# Patient Record
Sex: FEMALE | Race: WHITE
[De-identification: names, ages, dates, MRNs, and addresses within clinical notes are randomized per-mention and may not be internally consistent; named-entity substitution may affect disease eponyms.]

---

## 2019-06-11 ENCOUNTER — HOSPITAL ENCOUNTER (OUTPATIENT)
Dept: HOSPITAL 84 - D.RT | Age: 54
Discharge: HOME | End: 2019-06-11
Attending: INTERNAL MEDICINE
Payer: MEDICAID

## 2019-06-11 DIAGNOSIS — J44.9: Primary | ICD-10-CM

## 2019-09-18 ENCOUNTER — HOSPITAL ENCOUNTER (OUTPATIENT)
Dept: HOSPITAL 84 - D.OPS | Age: 54
Discharge: HOME | End: 2019-09-18
Attending: INTERNAL MEDICINE
Payer: MEDICAID

## 2019-09-18 VITALS
HEIGHT: 60 IN | BODY MASS INDEX: 27.54 KG/M2 | WEIGHT: 140.29 LBS | WEIGHT: 140.29 LBS | BODY MASS INDEX: 27.54 KG/M2 | HEIGHT: 60 IN

## 2019-09-18 VITALS — DIASTOLIC BLOOD PRESSURE: 79 MMHG | SYSTOLIC BLOOD PRESSURE: 130 MMHG

## 2019-09-18 DIAGNOSIS — K29.70: ICD-10-CM

## 2019-09-18 DIAGNOSIS — K21.0: ICD-10-CM

## 2019-09-18 DIAGNOSIS — K22.2: ICD-10-CM

## 2019-09-18 DIAGNOSIS — R11.2: ICD-10-CM

## 2019-09-18 DIAGNOSIS — K44.9: ICD-10-CM

## 2019-09-18 DIAGNOSIS — R13.10: Primary | ICD-10-CM

## 2019-09-18 LAB
ANION GAP SERPL CALC-SCNC: 12.5 MMOL/L (ref 8–16)
BUN SERPL-MCNC: 22 MG/DL (ref 7–18)
CALCIUM SERPL-MCNC: 9 MG/DL (ref 8.5–10.1)
CHLORIDE SERPL-SCNC: 108 MMOL/L (ref 98–107)
CO2 SERPL-SCNC: 28.9 MMOL/L (ref 21–32)
CREAT SERPL-MCNC: 0.9 MG/DL (ref 0.6–1.3)
ERYTHROCYTE [DISTWIDTH] IN BLOOD BY AUTOMATED COUNT: 13.1 % (ref 11.5–14.5)
GLUCOSE SERPL-MCNC: 102 MG/DL (ref 74–106)
HCT VFR BLD CALC: 38.4 % (ref 36–48)
HGB BLD-MCNC: 13 G/DL (ref 12–16)
MCH RBC QN AUTO: 27.9 PG (ref 26–34)
MCHC RBC AUTO-ENTMCNC: 33.9 G/DL (ref 31–37)
MCV RBC: 82.4 FL (ref 80–100)
OSMOLALITY SERPL CALC.SUM OF ELEC: 291 MOSM/KG (ref 275–300)
PLATELET # BLD: 230 10X3/UL (ref 130–400)
PMV BLD AUTO: 10.4 FL (ref 7.4–10.4)
POTASSIUM SERPL-SCNC: 4.4 MMOL/L (ref 3.5–5.1)
RBC # BLD AUTO: 4.66 10X6/UL (ref 4–5.4)
SODIUM SERPL-SCNC: 145 MMOL/L (ref 136–145)
WBC # BLD AUTO: 7.2 10X3/UL (ref 4.8–10.8)

## 2019-09-18 NOTE — OP
PATIENT NAME:  KARINE CHAWLA                    MEDICAL RECORD: [id number]
:61                                             LOCATION:[facility]          
                                                         ADMISSION DATE:        
SURGEON:  BAO THOMPSON DO             
 
 
DATE OF OPERATION:  2019
 
PROCEDURE:  EGD with biopsies.
 
INDICATIONS FOR PROCEDURE:  Dysphagia, GERD, nausea, and vomiting.
 
SCOPE:  Olympus video gastroscope.
 
MEDICATIONS:  Propofol 150 mg IV per anesthesia.
 
ESTIMATED BLOOD LOSS:  Minimal.
 
COMPLICATIONS:  None.
 
FINDINGS:  Informed consent was given.  The patient was made comfortable with
the above medication.  After reaching an adequate level of sedation by slow IV
push, the patient was placed on her left side.  The endoscope was advanced under
direct visualization through the mouth to the second portion of the duodenum
with ease.  The entire esophagus appeared normal down to the GE junction.  At
the GE junction, there was some mild esophageal stenosis which was traversed. 
At the end of the procedure, an 18-20 mm CRE dilating balloon was placed through
the working channel of the endoscope and the stenosis was dilated up to 20 mm
diameter maximum successfully.  At the GE junction, there was evidence of LA
class C reflux-induced esophagitis.  Cold forceps biopsies were taken to submit
for histopathology.  The endoscope was advanced beyond the GE junction into the
stomach and retroflexed to view the cardia, where a small sliding hiatal hernia
was present.  The fundus of the stomach appeared normal.  Throughout the body
and antrum of the stomach, there was some erythema, granularity, and congestion
consistent with gastritis.  Cold forceps, biopsies were taken to submit for
histopathology and to rule out the presence of H. pylori.  The endoscope was
advanced beyond the pylorus into the duodenum, which appeared normal down to the
second portion.  The endoscope was then withdrawn from the patient.  The patient
tolerated the procedure well and there were no complications.
 
IMPRESSION:
1.  Esophageal ring/stenosis located at the GE junction, status post dilation
with a CRE balloon to 20 mm successfully.
2.  LA class C reflux-induced esophagitis.
3.  Small sliding hiatal hernia.
4.  Gastritis.
 
PLAN AND RECOMMENDATIONS:
1.  Discharge home when recovery parameters are met.
2.  Follow up biopsy specimen results.
3.  GERD diet and reflux precautions.
4.  Continue omeprazole, but increase to 40 mg in the morning.
5.  We will add Zantac or Pepcid in the evening for 30 days.
6.  Follow up in the GI clinic in 1 month.
7.  Consider manometry study if continued dysphagia.
8.  Consider workup of gallbladder and gastric emptying scan if continued nausea
and vomiting at followup.
 
 
 
 
OPERATIVE REPORT                               [id number]    CHAWLAKARINE  
 
 
TRANSINT:[id number] Voice Confirmation ID: [id number] DOCUMENT ID: [id number]
                                           
                                           BAO THOMPSON DO             
 
 
 
Electronically Signed by BAO CANTU on 19 at 1630
 
 
 
 
 
 
 
 
 
 
 
 
 
 
 
 
 
 
 
 
 
 
 
 
 
 
 
 
 
 
 
 
 
 
 
 
 
 
 
 
CC:                                                             [id number]
DICTATION DATE: 19 1025     :     19 1207      [facility] 
                                                                      19
[facility]                                          
[address] [place] [address]

## 2019-10-29 ENCOUNTER — HOSPITAL ENCOUNTER (OUTPATIENT)
Dept: HOSPITAL 84 - D.RAD | Age: 54
Discharge: HOME | End: 2019-10-29
Attending: INTERNAL MEDICINE
Payer: MEDICAID

## 2019-10-29 DIAGNOSIS — R13.10: Primary | ICD-10-CM

## 2019-10-29 DIAGNOSIS — K21.9: ICD-10-CM

## 2019-10-29 DIAGNOSIS — R10.9: ICD-10-CM

## 2019-11-04 ENCOUNTER — HOSPITAL ENCOUNTER (OUTPATIENT)
Dept: HOSPITAL 84 - D.OPS | Age: 54
Discharge: HOME | End: 2019-11-04
Attending: INTERNAL MEDICINE
Payer: MEDICAID

## 2019-11-04 VITALS
WEIGHT: 151.32 LBS | BODY MASS INDEX: 29.71 KG/M2 | WEIGHT: 151.32 LBS | HEIGHT: 60 IN | HEIGHT: 60 IN | BODY MASS INDEX: 29.71 KG/M2

## 2019-11-04 VITALS — DIASTOLIC BLOOD PRESSURE: 83 MMHG | SYSTOLIC BLOOD PRESSURE: 142 MMHG

## 2019-11-04 DIAGNOSIS — K63.5: Primary | ICD-10-CM

## 2019-11-04 DIAGNOSIS — R19.7: ICD-10-CM

## 2019-11-04 DIAGNOSIS — K57.90: ICD-10-CM

## 2019-11-04 DIAGNOSIS — R10.9: ICD-10-CM

## 2019-11-04 DIAGNOSIS — K64.8: ICD-10-CM

## 2019-11-04 LAB
ANION GAP SERPL CALC-SCNC: 8.7 MMOL/L (ref 8–16)
BUN SERPL-MCNC: 14 MG/DL (ref 7–18)
CALCIUM SERPL-MCNC: 9.5 MG/DL (ref 8.5–10.1)
CHLORIDE SERPL-SCNC: 103 MMOL/L (ref 98–107)
CO2 SERPL-SCNC: 27.9 MMOL/L (ref 21–32)
CREAT SERPL-MCNC: 0.8 MG/DL (ref 0.6–1.3)
ERYTHROCYTE [DISTWIDTH] IN BLOOD BY AUTOMATED COUNT: 13.3 % (ref 11.5–14.5)
GLUCOSE SERPL-MCNC: 98 MG/DL (ref 74–106)
HCT VFR BLD CALC: 43.6 % (ref 36–48)
HGB BLD-MCNC: 14.3 G/DL (ref 12–16)
MCH RBC QN AUTO: 28.2 PG (ref 26–34)
MCHC RBC AUTO-ENTMCNC: 32.8 G/DL (ref 31–37)
MCV RBC: 86 FL (ref 80–100)
OSMOLALITY SERPL CALC.SUM OF ELEC: 272 MOSM/KG (ref 275–300)
PLATELET # BLD: 289 10X3/UL (ref 130–400)
PMV BLD AUTO: 10.7 FL (ref 7.4–10.4)
POTASSIUM SERPL-SCNC: 3.6 MMOL/L (ref 3.5–5.1)
RBC # BLD AUTO: 5.07 10X6/UL (ref 4–5.4)
SODIUM SERPL-SCNC: 136 MMOL/L (ref 136–145)
WBC # BLD AUTO: 6.7 10X3/UL (ref 4.8–10.8)

## 2019-11-06 NOTE — OP
PATIENT NAME:  KARINE CHAWLA                    MEDICAL RECORD: [id number]
:61                                             LOCATION:[facility]          
                                                         ADMISSION DATE:        
SURGEON:  BAO THOMPSON DO             
 
 
DATE OF OPERATION:  2019
 
PROCEDURE:  Colonoscopy with polypectomy.
 
INDICATIONS FOR PROCEDURE:  Diarrhea and generalized abdominal pain.
 
SCOPE:  Olympus video pediatric colonoscope.
 
MEDICATIONS:  Propofol 280 mg IV per anesthesia.
 
WITHDRAWAL TIME:  9 minutes.
 
ESTIMATED BLOOD LOSS:  Minimal.
 
COMPLICATIONS:  None.
 
FINDINGS:  Informed consent was given.  The patient was made comfortable with
the above medication.  After reaching an adequate level of sedation by slow IV
push, the patient was placed on her left side.  A digital rectal examination was
performed and was normal.  The endoscope was then advanced under direct
visualization through the rectum to the cecum and terminal ileum.  The endoscope
was slowly withdrawn and the mucosa was carefully examined.  The prep quality
was good.  There was a single benign-appearing sessile polyp, which measured
approximately 4 mm in diameter that was located in the sigmoid colon.  It was
removed using hot forceps.  There was evidence of pan diverticulosis of mild
severity.  There was no evidence of diverticulitis.  Retroflexion was performed
in the rectum with visualization of grade I internal hemorrhoids without
bleeding.  The endoscope was withdrawn from the patient.  The patient tolerated
the procedure well and there were no complications.
 
IMPRESSION:
1.  Benign appearing sessile polyp located in the sigmoid colon, which was
removed using hot forceps.
2.  Mild pan diverticulosis without diverticulitis.
3.  Grade I internal hemorrhoids without bleeding.
 
PLAN AND RECOMMENDATIONS:
1.  Discharge home when recovery parameters are met.
2.  Follow up biopsy specimen results.
3.  High fiber diet.
4.  Continue current medications.
5.  Recall colonoscopy in 5 years for surveillance based on personal history of
polyps.
6.  The patient should notify the clinic if the diarrhea returns or the
abdominal pain returns.  Currently, it seems that these symptoms have resolved.
 
TRANSINT:[id number] Voice Confirmation ID: [id number] DOCUMENT ID: [id number]
 
 
 
OPERATIVE REPORT                               [id number]    KARINE CHAWLA  
 
 
                                           
                                           BAO THOMPSON DO             
 
 
 
Electronically Signed by BAO CANTU on 19 at 0736
 
 
 
 
 
 
 
 
 
 
 
 
 
 
 
 
 
 
 
 
 
 
 
 
 
 
 
 
 
 
 
 
 
 
 
 
 
 
 
 
 
CC:                                                             [id number]
DICTATION DATE: 19 1037     :     19 1050      [facility] 
                                                                      19
[facility]                                          
 [place] [place] [address]

## 2019-11-21 ENCOUNTER — HOSPITAL ENCOUNTER (OUTPATIENT)
Dept: HOSPITAL 84 - D.OPS | Age: 54
Discharge: HOME | End: 2019-11-21
Attending: INTERNAL MEDICINE
Payer: MEDICAID

## 2019-11-21 VITALS — BODY MASS INDEX: 29.5 KG/M2

## 2019-11-21 DIAGNOSIS — R13.10: Primary | ICD-10-CM

## 2019-11-21 DIAGNOSIS — K21.9: ICD-10-CM

## 2019-11-21 DIAGNOSIS — R10.9: ICD-10-CM

## 2020-01-17 ENCOUNTER — HOSPITAL ENCOUNTER (OUTPATIENT)
Dept: HOSPITAL 84 - D.NM | Age: 55
Discharge: HOME | End: 2020-01-17
Attending: SURGERY
Payer: MEDICAID

## 2020-01-17 VITALS — BODY MASS INDEX: 29.5 KG/M2

## 2020-01-17 DIAGNOSIS — K21.0: Primary | ICD-10-CM

## 2020-06-24 ENCOUNTER — HOSPITAL ENCOUNTER (OUTPATIENT)
Dept: HOSPITAL 84 - D.MAMMO | Age: 55
Discharge: HOME | End: 2020-06-24
Attending: CLINICAL NURSE SPECIALIST
Payer: MEDICAID

## 2020-06-24 VITALS — BODY MASS INDEX: 29.3 KG/M2

## 2020-06-24 DIAGNOSIS — Z12.31: Primary | ICD-10-CM

## 2021-05-05 ENCOUNTER — HOSPITAL ENCOUNTER (OUTPATIENT)
Dept: HOSPITAL 84 - D.LAB | Age: 56
Discharge: HOME | End: 2021-05-05
Attending: INTERNAL MEDICINE
Payer: MEDICAID

## 2021-05-05 VITALS — BODY MASS INDEX: 29.3 KG/M2

## 2021-05-05 DIAGNOSIS — J45.909: Primary | ICD-10-CM

## 2021-05-10 ENCOUNTER — HOSPITAL ENCOUNTER (OUTPATIENT)
Dept: HOSPITAL 84 - D.RT | Age: 56
Discharge: HOME | End: 2021-05-10
Attending: INTERNAL MEDICINE
Payer: MEDICAID

## 2021-05-10 VITALS — BODY MASS INDEX: 29.3 KG/M2

## 2021-05-10 DIAGNOSIS — J45.909: Primary | ICD-10-CM
